# Patient Record
Sex: MALE | Race: WHITE | Employment: STUDENT | URBAN - METROPOLITAN AREA
[De-identification: names, ages, dates, MRNs, and addresses within clinical notes are randomized per-mention and may not be internally consistent; named-entity substitution may affect disease eponyms.]

---
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
## 2025-07-18 ENCOUNTER — OFFICE VISIT (OUTPATIENT)
Dept: FAMILY MEDICINE CLINIC | Age: 20
End: 2025-07-18

## 2025-07-18 VITALS
DIASTOLIC BLOOD PRESSURE: 70 MMHG | SYSTOLIC BLOOD PRESSURE: 100 MMHG | HEIGHT: 71 IN | BODY MASS INDEX: 20.64 KG/M2 | HEART RATE: 65 BPM | OXYGEN SATURATION: 99 % | WEIGHT: 147.4 LBS

## 2025-07-18 DIAGNOSIS — Z87.81 HISTORY OF TOE FRACTURE: ICD-10-CM

## 2025-07-18 DIAGNOSIS — Z76.89 ENCOUNTER TO ESTABLISH CARE: Primary | ICD-10-CM

## 2025-07-18 SDOH — ECONOMIC STABILITY: FOOD INSECURITY: WITHIN THE PAST 12 MONTHS, THE FOOD YOU BOUGHT JUST DIDN'T LAST AND YOU DIDN'T HAVE MONEY TO GET MORE.: NEVER TRUE

## 2025-07-18 SDOH — ECONOMIC STABILITY: FOOD INSECURITY: WITHIN THE PAST 12 MONTHS, YOU WORRIED THAT YOUR FOOD WOULD RUN OUT BEFORE YOU GOT MONEY TO BUY MORE.: NEVER TRUE

## 2025-07-18 ASSESSMENT — PATIENT HEALTH QUESTIONNAIRE - PHQ9
SUM OF ALL RESPONSES TO PHQ QUESTIONS 1-9: 0
2. FEELING DOWN, DEPRESSED OR HOPELESS: NOT AT ALL
SUM OF ALL RESPONSES TO PHQ QUESTIONS 1-9: 0
SUM OF ALL RESPONSES TO PHQ QUESTIONS 1-9: 0
1. LITTLE INTEREST OR PLEASURE IN DOING THINGS: NOT AT ALL
SUM OF ALL RESPONSES TO PHQ QUESTIONS 1-9: 0

## 2025-07-18 ASSESSMENT — ENCOUNTER SYMPTOMS
BACK PAIN: 0
COLOR CHANGE: 0
SORE THROAT: 0
CONSTIPATION: 0
RHINORRHEA: 0
PHOTOPHOBIA: 0
NAUSEA: 0
WHEEZING: 0
DIARRHEA: 0
ABDOMINAL PAIN: 0
CHEST TIGHTNESS: 0
ABDOMINAL DISTENTION: 0
TROUBLE SWALLOWING: 0
COUGH: 0
SHORTNESS OF BREATH: 0

## 2025-07-18 NOTE — PATIENT INSTRUCTIONS
03/16/2018 FINDINGS: There is a partially remodeled satisfactory aligned transverse fracture of the proximal metaphysis of the left third metatarsal.  There is sclerosis seen at the opposing surfaces of the fracture.  The bones and joints are otherwise unremarkable       Drink at least 64 oz of water daily, sleep 7-9 hours nightly, get at least 150 minutes of cardiac exercise weekly, walk more than 7,000 steps daily, limit diet to approximately 2,000-calories a day, consume all calories within a 10-hour window daily, try not to eat within 1 to 3 hours of bedtime, or within 1 to 3 hours of waking up, limit fatty, fried, and ultra-processed foods in the diet, and limit toxins (alcohol, smoking, etc).        - \"Can't Hurt Me\" - Jayden Tran  - \"Death By Comfort\" - Anshu Yanes  - \"The Comfort Crisis\" - Syd Mensah  - \"Total Money Makeover\" - Kavin Edwards

## 2025-07-18 NOTE — PROGRESS NOTES
Ahmet Mckenzie (:  2005) is a 19 y.o. male,New patient, here for evaluation of the following chief complaint(s):  Establish Care and Foot Swelling (Left foot swells for about a week and happens every 2-3 months. Is painful when it happens. This past time he is going on 3 weeks. Not as swollen right now but still slight. No pain as of now. Mom concerned maybe thyroid. This has been happening for 2 years. Swelling does not go away when elevating. Pain is in the 3 middle toe joints into top of foot and front of ankle feels like it is being stretched)      Assessment & Plan  Encounter to establish care   -Personal medical history, surgical history, family history reviewed.  Medications reconciled.  -Care gaps addressed.  Deferred screenings, up-to-date on vaccinations today.  -Age-appropriate healthcare topics discussed.  -Educated on office policies and procedures.  -Follow-up in 12 months for physical       History of toe fracture   Chronic, not at goal (unstable), intermittent swelling and pain  - Reproducible today  -Previous x-rays reviewed, aligned fracture of the third metatarsal of left foot identified 2018  -Discussed RICE, ibuprofen  -Strongly encouraged insoles and multiple pairs of shoes  -Follow-up if no improvement, can consider advanced imaging         Return in about 1 year (around 2026) for Physical.    Subjective       Foot Swelling  Associated symptoms include arthralgias (L foot). Pertinent negatives include no abdominal pain, chest pain, chills, congestion, coughing, diaphoresis, fatigue, headaches, joint swelling, myalgias, nausea, numbness, rash, sore throat or weakness.     - establish care today  - hx L foot swells for a week at a time, occurs every few months  - this time lasting a few more weeks  - can be painful w/ swelling  - notes pitting  - toes 2 3 4 hurt w/ pressure  - pressing on heel and stretching front of foot hurts too  - pain only w/ swelling  - no usual

## (undated) DEVICE — SYRINGE,EAR/ULCER, 2 OZ, STERILE: Brand: MEDLINE

## (undated) DEVICE — MAGNETIC INSTRUMENT PAD 10" X 16"; MEDIUM; DISPOSABLE: Brand: CARDINAL HEALTH

## (undated) DEVICE — PENCIL ES CRD L10FT HND SWCHING ROCK SWCH W/ EDGE COAT BLDE

## (undated) DEVICE — GLOVE ORANGE PI 7 1/2   MSG9075

## (undated) DEVICE — PACK PROCEDURE SURG ORAL CDS

## (undated) DEVICE — MICRODISSECTION NEEDLE STRAIGHT SLEEVE: Brand: COLORADO

## (undated) DEVICE — JAW BRA SURG TMJ VELC CLSR SYS V CUT SUPP STRP 2 ZIPLOK BG

## (undated) DEVICE — GLOVE SURG SZ 8 L12IN FNGR THK79MIL GRN LTX FREE

## (undated) DEVICE — SUTURE CHROMIC GUT SZ 3-0 L27IN ABSRB BRN L26MM SH 1/2 CIR G122H